# Patient Record
Sex: FEMALE | Race: WHITE | Employment: FULL TIME | URBAN - METROPOLITAN AREA
[De-identification: names, ages, dates, MRNs, and addresses within clinical notes are randomized per-mention and may not be internally consistent; named-entity substitution may affect disease eponyms.]

---

## 2018-04-04 ENCOUNTER — HOSPITAL ENCOUNTER (OUTPATIENT)
Dept: MRI IMAGING | Age: 58
Discharge: HOME | End: 2018-04-04
Attending: FAMILY MEDICINE
Payer: COMMERCIAL

## 2018-04-04 DIAGNOSIS — H53.452 DECREASED PERIPHERAL VISION OF LEFT EYE: ICD-10-CM

## 2018-04-04 DIAGNOSIS — G44.52 NEW DAILY PERSISTENT HEADACHE: ICD-10-CM

## 2018-04-04 LAB — CREAT BLD-MCNC: 0.7 MG/DL (ref 0.6–1.3)

## 2018-04-04 PROCEDURE — 70553 MRI BRAIN STEM W/O & W/DYE: CPT

## 2018-04-04 PROCEDURE — 74011250636 HC RX REV CODE- 250/636: Performed by: FAMILY MEDICINE

## 2018-04-04 PROCEDURE — A9576 INJ PROHANCE MULTIPACK: HCPCS | Performed by: FAMILY MEDICINE

## 2018-04-04 PROCEDURE — 82565 ASSAY OF CREATININE: CPT

## 2018-04-04 RX ADMIN — GADOTERIDOL 15 ML: 279.3 INJECTION, SOLUTION INTRAVENOUS at 10:34

## 2018-04-06 ENCOUNTER — OFFICE VISIT (OUTPATIENT)
Dept: NEUROLOGY | Age: 58
End: 2018-04-06

## 2018-04-06 VITALS
DIASTOLIC BLOOD PRESSURE: 84 MMHG | BODY MASS INDEX: 24.29 KG/M2 | HEART RATE: 76 BPM | HEIGHT: 69 IN | OXYGEN SATURATION: 97 % | WEIGHT: 164 LBS | SYSTOLIC BLOOD PRESSURE: 124 MMHG | RESPIRATION RATE: 17 BRPM | TEMPERATURE: 98.1 F

## 2018-04-06 DIAGNOSIS — H53.9 VISUAL DISTURBANCE: Primary | ICD-10-CM

## 2018-04-06 RX ORDER — METFORMIN HYDROCHLORIDE 1000 MG/1
1000 TABLET ORAL DAILY
COMMUNITY

## 2018-04-06 RX ORDER — LOSARTAN POTASSIUM AND HYDROCHLOROTHIAZIDE 25; 100 MG/1; MG/1
TABLET ORAL
COMMUNITY
Start: 2018-01-31

## 2018-04-06 RX ORDER — BISMUTH SUBSALICYLATE 262 MG
1 TABLET,CHEWABLE ORAL DAILY
COMMUNITY

## 2018-04-06 NOTE — MR AVS SNAPSHOT
[address] [facility] [address] [facility] [id number] [phone] Patient: Amy Litten MRN: [id number] Visit Information Date & Time Provider Department Dept. Phone Encounter #  
 4/6/2018  9:00 AM MD Raiza Farrar Neurology [facility] [phone] [id number] Follow-up Instructions Return for After Tests. Upcoming Health Maintenance Date Due Hepatitis C Screening 1960 DTaP/Tdap/Td series (1 - Tdap) 4/18/1981 PAP AKA CERVICAL CYTOLOGY 4/18/1981 BREAST CANCER SCRN MAMMOGRAM 4/18/2010 FOBT Q 1 YEAR AGE 50-75 4/18/2010 Influenza Age 5 to Adult 8/1/2017 Allergies as of 4/6/2018  Review Complete On: 4/6/2018 By: Carlos Harris LPN Severity Noted Reaction Type Reactions Other Medication  04/06/2018    Unknown (comments) Peridium, symethicon Pcn [Penicillins]  04/06/2018    Unknown (comments) Tape [Adhesive]  04/06/2018    Unknown (comments) Current Immunizations  Never Reviewed No immunizations on file. Not reviewed this visit You Were Diagnosed With   
  
 Codes Comments Visual disturbance    -  Primary ICD-10-CM: H53.9 ICD-9-CM: 368.9 Vitals BP Pulse Temp Resp Height(growth percentile) Weight(growth percentile) 124/84 76 98.1 °F (36.7 °C) 17 5' 8.5\" (1.74 m) 164 lb (74.4 kg) SpO2 BMI OB Status Smoking Status 97% 24.57 kg/m2 Postmenopausal Never Smoker Vitals History BMI and BSA Data Body Mass Index Body Surface Area 24.57 kg/m 2 1.9 m 2 Your Updated Medication List  
  
   
This list is accurate as of 4/6/18  9:53 AM.  Always use your most recent med list. ADVIL PO Take  by mouth. CALCIUM 500 PO Take  by mouth. IRON PO Take  by mouth.  
  
 losartan-hydroCHLOROthiazide 100-25 mg per tablet Commonly known as:  HYZAAR  
  
 metFORMIN 1,000 mg tablet Commonly known as:  GLUCOPHAGE Take 1,000 mg by mouth daily. multivitamin tablet Commonly known as:  ONE A DAY Take 1 Tab by mouth daily. OMEGA 3 PO Take 1,000 mg by mouth daily. TURMERIC ROOT EXTRACT PO Take  by mouth. We Performed the Following REFERRAL TO NEUROLOGY [BQR86 Custom] Comments: VEP VEP VEP VEP Follow-up Instructions Return for After Tests. To-Do List   
 04/06/2018 Imaging:  DUPLEX CAROTID BILATERAL AMB NEURO   
  
 04/06/2018 Neurology:  EMG LIMITED Referral Information Referral ID Referred By Referred To  
  
 [id number] Terry RIVERA MD [address] [facility], [address] [place] Phone: [phone] Fax: [phone] Visits Status Start Date End Date 1 New Request 4/6/18 4/6/19 If your referral has a status of pending review or denied, additional information will be sent to support the outcome of this decision. Patient Instructions Information Regarding Testing If you have physican order for a test or a medication denied by your insurance company, this does not mean the test or medication is not appropriate for you as that is a medical decision, not a decision to be made by an insurance company representative or by an [facility] physician who has not interviewed and examined you. This is a decision to be made between you and your physician. The denial of services is a contractual matter between you and your insurance company, not an issue between your physician and the insurance company. If your test or medication is denied, you can take the following steps to help resolve the issue: 1. File a complaint with the [facility] regarding your insurance company's denial of services ordered for you.   You can do this either by calling them directly or by completing an on-line complaint form on the [employer]. This can be found at www.[place].gov 2. Also file a formal complaint with your insurance company and ask to have the name of the person denying the service so that you may explore a legal option should you be harmed by this denial of service. Again, the fact the insurance company will not pay for the service does not mean it is not medically necessary and I would encourage you to follow through with the plan that was made with your physician 3. File a written complaint with your employer so your employer and benefit manager is aware of the poor coverage they are providing their employees. If you have medicare/medicaid, complain to your representative in the House and to your Phoebe Maria. PRESCRIPTION REFILL POLICY [facility] Statement to Patients April 1, 2014 In an effort to ensure the large volume of patient prescription refills is processed in the most efficient and expeditious manner, we are asking our patients to assist us by calling your Pharmacy for all prescription refills, this will include also your  Mail Order Pharmacy. The pharmacy will contact our office electronically to continue the refill process. Please do not wait until the last minute to call your pharmacy. We need at least 48 hours (2days) to fill prescriptions. We also encourage you to call your pharmacy before going to  your prescription to make sure it is ready. With regard to controlled substance prescription refill requests (narcotic refills) that need to be picked up at our office, we ask your cooperation by providing us with at least 72 hours (3days) notice that you will need a refill. We will not refill narcotic prescription refill requests after 4:00pm on any weekday, Monday through Thursday, or after 2:00pm on Fridays, or on the weekends. We encourage everyone to explore another way of getting your prescription refill request processed using Knomo, our patient web portal through our electronic medical record system. Knomo is an efficient and effective way to communicate your medication request directly to the office and  downloadable as an lili on your smart phone . Knomo also features a review functionality that allows you to view your medication list as well as leave messages for your physician. Are you ready to get connected? If so please review the attatched instructions or speak to any of our staff to get you set up right away! Thank you so much for your cooperation. Should you have any questions please contact our Practice Administrator. The Physicians and Staff,  [facility] If we have ordered testing for you, we do not call patients with results and we do not give test results over the phone. We schedule follow up appointments so that your results can be discussed in person and any questions you have regarding them may be addressed. If something of concern is revealed on your test, we will call you for a sooner follow up appointment. Additionally, results may be found by using the My Chart feature and one of our patient service representatives at the  can give you instructions on how to access this feature of our electronic medical record system. [address] What is a living will? A living will is a legal form you use to write down the kind of care you want at the end of your life. It is used by the health professionals who will treat you if you aren't able to decide for yourself. If you put your wishes in writing, your loved ones and others will know what kind of care you want. They won't need to guess. This can ease your mind and be helpful to others. A living will is not the same as an estate or property will.  An estate will explains what you want to happen with your money and property after you die. Is a living will a legal document? A living will is a legal document. Each state has its own laws about living le. If you move to another state, make sure that your living will is legal in the state where you now live. Or you might use a universal form that has been approved by many states. This kind of form can sometimes be completed and stored online. Your electronic copy will then be available wherever you have a connection to the Internet. In most cases, doctors will respect your wishes even if you have a form from a different state. · You don't need an  to complete a living will. But legal advice can be helpful if your state's laws are unclear, your health history is complicated, or your family can't agree on what should be in your living will. · You can change your living will at any time. Some people find that their wishes about end-of-life care change as their health changes. · In addition to making a living will, think about completing a medical power of  form. This form lets you name the person you want to make end-of-life treatment decisions for you (your \"health care agent\") if you're not able to. Many hospitals and nursing homes will give you the forms you need to complete a living will and a medical power of . · Your living will is used only if you can't make or communicate decisions for yourself anymore. If you become able to make decisions again, you can accept or refuse any treatment, no matter what you wrote in your living will. · Your state may offer an online registry. This is a place where you can store your living will online so the doctors and nurses who need to treat you can find it right away. What should you think about when creating a living will?  
Talk about your end-of-life wishes with your family members and your doctor. Let them know what you want. That way the people making decisions for you won't be surprised by your choices. Think about these questions as you make your living will: · Do you know enough about life support methods that might be used? If not, talk to your doctor so you know what might be done if you can't breathe on your own, your heart stops, or you're unable to swallow. · What things would you still want to be able to do after you receive life-support methods? Would you want to be able to walk? To speak? To eat on your own? To live without the help of machines? · If you have a choice, where do you want to be cared for? In your home? At a hospital or nursing home? · Do you want certain Oriental orthodox practices performed if you become very ill? · If you have a choice at the end of your life, where would you prefer to die? At home? In a hospital or nursing home? Somewhere else? · Would you prefer to be buried or cremated? · Do you want your organs to be donated after you die? What should you do with your living will? · Make sure that your family members and your health care agent have copies of your living will. · Give your doctor a copy of your living will to keep in your medical record. If you have more than one doctor, make sure that each one has a copy. · You may want to put a copy of your living will where it can be easily found. Where can you learn more? Go to http://brian-braxton.info/. Enter R037 in the search box to learn more about \"Learning About Living Prem. \" Current as of: September 24, 2016 Content Version: 11.4 © 0088-3479 [employer]. Care instructions adapted under license by [employer] (which disclaims liability or warranty for this information).  If you have questions about a medical condition or this instruction, always ask your healthcare professional. [address]  any warranty or liability for your use of this information. Introducing [facility]! [employer] introduces [employer] patient portal. Now you can access parts of your medical record, email your doctor's office, and request medication refills online. 1. In your internet browser, go to [email or web address] 2. Click on the First Time User? Click Here link in the Sign In box. You will see the New Member Sign Up page. 3. Enter your [employer] Access Code exactly as it appears below. You will not need to use this code after youve completed the sign-up process. If you do not sign up before the expiration date, you must request a new code. · [employer] Access Code: [email or web address] Expires: 7/2/2018  4:15 PM 
 
4. Enter the last four digits of your Social Security Number (xxxx) and Date of Birth (mm/dd/yyyy) as indicated and click Submit. You will be taken to the next sign-up page. 5. Create a [employer] ID. This will be your [employer] login ID and cannot be changed, so think of one that is secure and easy to remember. 6. Create a [employer] password. You can change your password at any time. 7. Enter your Password Reset Question and Answer. This can be used at a later time if you forget your password. 8. Enter your e-mail address. You will receive e-mail notification when new information is available in [address] 9. Click Sign Up. You can now view and download portions of your medical record. 10. Click the Download Summary menu link to download a portable copy of your medical information. If you have questions, please visit the Frequently Asked Questions section of the [employer] website. Remember, [employer] is NOT to be used for urgent needs. For medical emergencies, dial 911. Now available from your iPhone and Android! Please provide this summary of care documentation to your next provider. Your primary care clinician is listed as Melvi Saldivar.  If you have any questions after today's visit, please call [phone].

## 2018-04-06 NOTE — PROGRESS NOTES
New patient presenting with blurred vision in left eye for past 10 days. Has tingling on left side of face with pain behind left eye. Has seen opthalmology @ [facility]. Testing WNL. Had MRI brain. WNL.

## 2018-04-06 NOTE — PATIENT INSTRUCTIONS
Information Regarding Testing     If you have physican order for a test or a medication denied by your insurance company, this does not mean the test or medication is not appropriate for you as that is a medical decision, not a decision to be made by an insurance company representative or by an [facility] physician who has not interviewed and examined you. This is a decision to be made between you and your physician. The denial of services is a contractual matter between you and your insurance company, not an issue between your physician and the insurance company. If your test or medication is denied, you can take the following steps to help resolve the issue:    1. File a complaint with the [facility] regarding your insurance company's denial of services ordered for you. You can do this either by calling them directly or by completing an on-line complaint form on the [employer]. This can be found at www.120 Sports    2. Also file a formal complaint with your insurance company and ask to have the name of the person denying the service so that you may explore a legal option should you be harmed by this denial of service. Again, the fact the insurance company will not pay for the service does not mean it is not medically necessary and I would encourage you to follow through with the plan that was made with your physician    3. File a written complaint with your employer so your employer and benefit manager is aware of the poor coverage they are providing their employees. If you have medicare/medicaid, complain to your representative in the House and to your Phoebe Maria.     10 [facility]   Statement to Patients  April 1, 2014      In an effort to ensure the large volume of patient prescription refills is processed in the most efficient and expeditious manner, we are asking our patients to assist us by calling your Pharmacy for all prescription refills, this will include also your  Mail Order Pharmacy. The pharmacy will contact our office electronically to continue the refill process. Please do not wait until the last minute to call your pharmacy. We need at least 48 hours (2days) to fill prescriptions. We also encourage you to call your pharmacy before going to  your prescription to make sure it is ready. With regard to controlled substance prescription refill requests (narcotic refills) that need to be picked up at our office, we ask your cooperation by providing us with at least 72 hours (3days) notice that you will need a refill. We will not refill narcotic prescription refill requests after 4:00pm on any weekday, Monday through Thursday, or after 2:00pm on Fridays, or on the weekends. We encourage everyone to explore another way of getting your prescription refill request processed using Passado, our patient web portal through our electronic medical record system. Passado is an efficient and effective way to communicate your medication request directly to the office and  downloadable as an lili on your smart phone . Passado also features a review functionality that allows you to view your medication list as well as leave messages for your physician. Are you ready to get connected? If so please review the attatched instructions or speak to any of our staff to get you set up right away! Thank you so much for your cooperation. Should you have any questions please contact our Practice Administrator. The Physicians and Staff,  [facility]     If we have ordered testing for you, we do not call patients with results and we do not give test results over the phone. We schedule follow up appointments so that your results can be discussed in person and any questions you have regarding them may be addressed.   If something of concern is revealed on your test, we will call you for a sooner follow up appointment. Additionally, results may be found by using the My Chart feature and one of our patient service representatives at the  can give you instructions on how to access this feature of our electronic medical record system. Learning About Living Prem  What is a living will? A living will is a legal form you use to write down the kind of care you want at the end of your life. It is used by the health professionals who will treat you if you aren't able to decide for yourself. If you put your wishes in writing, your loved ones and others will know what kind of care you want. They won't need to guess. This can ease your mind and be helpful to others. A living will is not the same as an estate or property will. An estate will explains what you want to happen with your money and property after you die. Is a living will a legal document? A living will is a legal document. Each state has its own laws about living le. If you move to another state, make sure that your living will is legal in the state where you now live. Or you might use a universal form that has been approved by many states. This kind of form can sometimes be completed and stored online. Your electronic copy will then be available wherever you have a connection to the Internet. In most cases, doctors will respect your wishes even if you have a form from a different state. · You don't need an  to complete a living will. But legal advice can be helpful if your state's laws are unclear, your health history is complicated, or your family can't agree on what should be in your living will. · You can change your living will at any time. Some people find that their wishes about end-of-life care change as their health changes. · In addition to making a living will, think about completing a medical power of  form.  This form lets you name the person you want to make end-of-life treatment decisions for you (your \"health care agent\") if you're not able to. Many hospitals and nursing homes will give you the forms you need to complete a living will and a medical power of . · Your living will is used only if you can't make or communicate decisions for yourself anymore. If you become able to make decisions again, you can accept or refuse any treatment, no matter what you wrote in your living will. · Your state may offer an online registry. This is a place where you can store your living will online so the doctors and nurses who need to treat you can find it right away. What should you think about when creating a living will? Talk about your end-of-life wishes with your family members and your doctor. Let them know what you want. That way the people making decisions for you won't be surprised by your choices. Think about these questions as you make your living will:  · Do you know enough about life support methods that might be used? If not, talk to your doctor so you know what might be done if you can't breathe on your own, your heart stops, or you're unable to swallow. · What things would you still want to be able to do after you receive life-support methods? Would you want to be able to walk? To speak? To eat on your own? To live without the help of machines? · If you have a choice, where do you want to be cared for? In your home? At a hospital or nursing home? · Do you want certain Mosque practices performed if you become very ill? · If you have a choice at the end of your life, where would you prefer to die? At home? In a hospital or nursing home? Somewhere else? · Would you prefer to be buried or cremated? · Do you want your organs to be donated after you die? What should you do with your living will? · Make sure that your family members and your health care agent have copies of your living will. · Give your doctor a copy of your living will to keep in your medical record.  If you have more than one doctor, make sure that each one has a copy. · You may want to put a copy of your living will where it can be easily found. Where can you learn more? Go to http://brian-braxton.info/. Enter F652 in the search box to learn more about \"Learning About Living Perroy. \"  Current as of: September 24, 2016  Content Version: 11.4  © 4124-9812 [employer]. Care instructions adapted under license by [employer] (which disclaims liability or warranty for this information). If you have questions about a medical condition or this instruction, always ask your healthcare professional. [address] any warranty or liability for your use of this information.

## 2018-04-06 NOTE — PROGRESS NOTES
EMG/ NCS Report  [facility], [address]  Ph: [phone]/ [phone]  FAX: [phone]/ [phone]  Test Date:  2018    Patient: Marizol Cordova : 1960 Physician: Zakiya Kamara   Sex: Female Height: ' \" Ref Phys: Rory Tapia MD   ID#: [id number] Weight:  lbs. Technician: Ronald Sanchez     Patient History / Exam:  CC:VISUAL DISTURBANCE,R/O MG          EMG & NCV Findings:    Impression:        ___________________________  CASPER RIVERA MD      VEP     Trace N75 (ms) P100 (ms) N145 (ms) P50-Y331 (µV)   Norm  <117     *L :  74.2 103.9 137.9 5.96   *R :  69.9 118.8 149.6 4.05   L-R Norm  <7     L-R 4.3 14.9 11.7 1.91   *A 92 millisecond delay was used for the monitor.                    Waveforms:

## 2018-04-08 NOTE — PROCEDURES
Carotid Doppler:     Date:  04/06/2018     Requesting Physician:  Nedra Ramos MD     Indication:  Visual disturbance     B-mode imaging reveals mild plaque at the bifurcations bilaterally. Doppler spectral analysis reveals no elevated velocities. Vertebral artery flow antegrade bilaterally. Interpretation:    1. Plaque as noted. 2. No significant stenosis.

## 2018-04-08 NOTE — PROCEDURES
[facility] [place]   Visual Evoked Potential Report    Date of Service: 4/6/2018  Referring: Dr. Anthony Keller  Indication: Visual Disturbance    Bilateral full field pattern reversal visual evoked potential is performed. Waveforms are appropriate for interpretation. Absolute latency of the P100 is prolonged on the right.     Interpretation  Prolongation of P100 right indicative of a lesion of the optic nerve anterior to the chiasm right    Matthew Martin MD

## 2018-04-12 ENCOUNTER — OFFICE VISIT (OUTPATIENT)
Dept: NEUROLOGY | Age: 58
End: 2018-04-12

## 2018-04-12 DIAGNOSIS — R20.2 PARESTHESIA: ICD-10-CM

## 2018-04-12 DIAGNOSIS — H53.2 DIPLOPIA: Primary | ICD-10-CM

## 2018-04-12 NOTE — PROCEDURES
EMG/ NCS Report   [facility]  [facility] [address] [place] [address] [address], [address]   Ph: [phone]   FAX: [phone]/ [phone]  Test Date:  2018    Patient: Breann Rosas : 1960 Physician: Socoby Corrigan MD   Sex: Female Height: ' \" Ref Phys: Nava Tubbs MD   ID#: [id number] Weight:  lbs. Technician: Shaun Urias     Patient History / Exam:  Patient is coming for intermittent blurred vision and double vision associated with numbness of the L face. Patient is coming for myasthenia gravis evaluation      EMG & NCV Findings:  Evaluation of the right median motor nerve showed normal distal onset latency (2.6 ms), normal amplitude (7.0 mV), and normal conduction velocity (Elbow-Wrist, 50 m/s). The right ulnar motor nerve showed normal distal onset latency (3.0 ms), normal amplitude (7.1 mV), normal conduction velocity (B Elbow-Wrist, 51 m/s), and normal conduction velocity (A Elbow-B Elbow, 63 m/s). The right median sensory, the right radial sensory, and the right ulnar sensory nerves showed normal distal peak latency (R2.8, R2.0, R3.0 ms) and normal amplitude (R46.7, R53.9, R40.9 µV). All F Wave latencies were within normal limits. Impression:  NORMAL    Nerve conduction study of the right upper extremity is normal with no evidence of a peripheral neuropathy. 3 Hz repetitive nerve stimulation and exercise testing of the right median nerve recording APB muscle and right spinal accessory nerve recording trapezius muscle are normal with no definite evidence to suggest a generalized post-synaptic disorder of the  neuromuscular junction.         Scooby Corrigan MD  Diplomate, American Board of Psychiatry and Neurology  Diplomate, Neuromuscular Medicine  Diplomate, American Board of Electrodiagnostic Medicine  Director, [address] [place] [address] Accredited Laboratory with Exemplary Status        Nerve Conduction Studies  Anti Sensory Summary Table Stim Site NR Peak (ms) Norm Peak (ms) P-T Amp (µV) Norm P-T Amp Site1 Site2 Dist (cm)   Right Median Anti Sensory (2nd Digit)  32.5°C   Wrist    2.8 <4 46.7 >13 Wrist 2nd Digit 14.0   Right Radial Anti Sensory (Base 1st Digit)  33.5°C   Wrist    2.0 <2.8 53.9 >11 Wrist Base 1st Digit 10.0   Right Ulnar Anti Sensory (5th Digit)  33.9°C   Wrist    3.0 <4.0 40.9 >9 Wrist 5th Digit 14.0     Motor Summary Table     Stim Site NR Onset (ms) Norm Onset (ms) O-P Amp (mV) Norm O-P Amp Amp (Prev) (%) Site1 Site2 Dist (cm) Daniel (m/s) Norm Daniel (m/s)   Right Median Motor (Abd Poll Brev)  33.5°C   Wrist    2.6 <4.5 7.0 >4.1 100.0 Wrist Abd Poll Brev 8.0     Elbow    7.0  7.0  100.0 Elbow Wrist 22.0 50 >49   Right Ulnar Motor (Abd Dig Minimi)  22.3°C   Wrist    3.0 <3.1 7.1 >7.0 100.0 Wrist Abd Dig Minimi 8.0  >50   B Elbow    7.0  6.6  93.0 B Elbow Wrist 20.5 51 >50   A Elbow    8.6  6.1  92.4 A Elbow B Elbow 10.0 63 >50     F Wave Studies     NR F-Lat (ms) Lat Norm (ms) L-R F-Lat (ms) L-R Lat Norm   Right Ulnar (Mrkrs) (Abd Dig Min)      27.78 <32  <2.5     RNS     Trial # Label Amp 1 (mV)  O-P Amp 5 (mV)  O-P Amp % Dif Area 1 (mV·ms) Area 5 (mV·ms) Area % Dif Rep Rate Train Length Pause Time (min:sec) Comments   Right Abd Poll Brev   Tr 8 Baseline 5.53 5.43 -1.8 19.70 18.98 -3.7 3.00 10 00:30    Tr 9 Post Excersise 5.97 6.03 1.1 20.40 19.92 -2.4 3.00 10 01:00    Tr 10 1 min Post 6.01 5.67 -5.6 21.96 20.12 -8.4 3.00 10 01:00    Tr 11 2 min Post 6.04 5.78 -4.3 22.16 20.21 -8.8 3.00 10 01:00    Tr 12 3 min Post 6.23 5.99 -3.9 22.16 20.75 -6.4 3.00 10 00:00    Right Trapezius   Tr 2 Baseline 14.47 14.55 0.6 203.29 194.59 -4.3 3.00 10 00:30    Tr 3 Post Excersise 14.37 14.25 -0.8 193.53 185.67 -4.1 3.00 10 01:00    Tr 4 1 min Post 14.50 14.38 -0.8 0.00 0.00 0.0 3.00 10 01:00    Tr 5 2 min Post 14.42 13.89 -3.7 193.07 182.56 -5.4 3.00 10 01:00    Tr 6 3 min Post 14.37 14.36 -0.1 196.82 0.00 -100.0 3.00 10 00:00

## 2018-04-27 ENCOUNTER — OFFICE VISIT (OUTPATIENT)
Dept: NEUROLOGY | Age: 58
End: 2018-04-27

## 2018-04-27 VITALS — BODY MASS INDEX: 25.03 KG/M2 | WEIGHT: 169 LBS | HEIGHT: 69 IN

## 2018-04-27 DIAGNOSIS — H53.9 VISUAL DISTURBANCE: Primary | ICD-10-CM

## 2018-04-27 NOTE — PATIENT INSTRUCTIONS
[facility]   Statement to Patients  April 1, 2014      In an effort to ensure the large volume of patient prescription refills is processed in the most efficient and expeditious manner, we are asking our patients to assist us by calling your Pharmacy for all prescription refills, this will include also your  Mail Order Pharmacy. The pharmacy will contact our office electronically to continue the refill process. Please do not wait until the last minute to call your pharmacy. We need at least 48 hours (2days) to fill prescriptions. We also encourage you to call your pharmacy before going to  your prescription to make sure it is ready. With regard to controlled substance prescription refill requests (narcotic refills) that need to be picked up at our office, we ask your cooperation by providing us with at least 72 hours (3days) notice that you will need a refill. We will not refill narcotic prescription refill requests after 4:00pm on any weekday, Monday through Thursday, or after 2:00pm on Fridays, or on the weekends. We encourage everyone to explore another way of getting your prescription refill request processed using [employer], our patient web portal through our electronic medical record system. [employer] is an efficient and effective way to communicate your medication request directly to the office and  downloadable as an lili on your smart phone . [employer] also features a review functionality that allows you to view your medication list as well as leave messages for your physician. Are you ready to get connected? If so please review the attatched instructions or speak to any of our staff to get you set up right away! Thank you so much for your cooperation. Should you have any questions please contact our Practice Administrator.     The Physicians and Staff,  [facility]

## 2018-04-27 NOTE — PROGRESS NOTES
Heather Bond is a 62 y.o. female who presents with the following  Chief Complaint   Patient presents with    Results       HPI Patient comes in for a follow up for EMG, VEP, and carotid doppler results. She states her vision is still compromised on the left side and it feels like she has vaseline over her eye when she is looking. She works at [employer] and has had multiple eye tests with no results. Also has had an MRI which was normal.     Allergies   Allergen Reactions    Other Medication Unknown (comments)     Peridium, symethicon    Pcn [Penicillins] Unknown (comments)    Tape [Adhesive] Unknown (comments)       Current Outpatient Prescriptions   Medication Sig    metFORMIN (GLUCOPHAGE) 1,000 mg tablet Take 1,000 mg by mouth daily.  losartan-hydroCHLOROthiazide (HYZAAR) 100-25 mg per tablet     IBUPROFEN (ADVIL PO) Take  by mouth.  FLAXSEED OIL (OMEGA 3 PO) Take 1,000 mg by mouth daily.  TURMERIC ROOT EXTRACT PO Take  by mouth.  FERROUS SULFATE (IRON PO) Take  by mouth.  CALCIUM CARBONATE (CALCIUM 500 PO) Take  by mouth.  multivitamin (ONE A DAY) tablet Take 1 Tab by mouth daily. No current facility-administered medications for this visit.         History   Smoking Status    Never Smoker   Smokeless Tobacco    Never Used       Past Medical History:   Diagnosis Date    Anxiety     Asthma     Benign tumor of kidney     Celiac disease     Diabetes ([address]     Episcleritis of left eye 04/25/2018    Fatigue     Headache     Herniated thoracic disc without myelopathy     Hypertension     Joint pain     Memory loss     Muscle pain     Muscle weakness     Snoring     Vertigo     Visual disturbance        Past Surgical History:   Procedure Laterality Date    HX GYN      HX ORTHOPAEDIC         Family History   Problem Relation Age of Onset    Headache Mother     Heart Disease Mother     Headache Sister        Social History     Social History    Marital status:  Spouse name: N/A    Number of children: N/A    Years of education: N/A     Social History Main Topics    Smoking status: Never Smoker    Smokeless tobacco: Never Used    Alcohol use No    Drug use: None    Sexual activity: Not Asked     Other Topics Concern    None     Social History Narrative       Review of Systems   Eyes: Positive for blurred vision. Remainder of comprehensive review is negative. Physical Exam :    Visit Vitals    Ht 5' 8.5\" (1.74 m)    Wt 76.7 kg (169 lb)    BMI 25.32 kg/m2             Results for orders placed or performed during the hospital encounter of 04/04/18   POC CREATININE   Result Value Ref Range    Creatinine (POC) 0.7 0.6 - 1.3 mg/dL    GFRAA, POC >60 >60 ml/min/1.73m2    GFRNA, POC >60 >60 ml/min/1.73m2       No orders of the defined types were placed in this encounter. No diagnosis found. Follow-up Disposition: Not on File    EMG, carotid doppler and VEP are all normal. We discussed these in full. She has seen an eye specialist as she works there. We discussed neuro eye vs. Rheumatology would be other referrals and she will talk to PCP. Print out results for her.  No other neurological testing warranted at this time           This note will not be viewable in [employer]

## 2018-04-27 NOTE — MR AVS SNAPSHOT
[address] [place] [address] [address] [facility] [address] [phone] Patient: Hawa Artis MRN: [id number] QMO:[id number][birth year]98/[birth year]46 Visit Information Date & Time Provider Department Dept. Phone Encounter #  
 4/27/2018  1:00 PM Deane Duane, NP [facility] [phone] [id number] Upcoming Health Maintenance Date Due Hepatitis C Screening 1960 DTaP/Tdap/Td series (1 - Tdap) 4/18/1981 PAP AKA CERVICAL CYTOLOGY 4/18/1981 BREAST CANCER SCRN MAMMOGRAM 4/18/2010 FOBT Q 1 YEAR AGE 50-75 4/18/2010 Influenza Age 5 to Adult 8/1/2018 Allergies as of 4/27/2018  Review Complete On: 4/27/2018 By: Sloane Eckert Severity Noted Reaction Type Reactions Other Medication  04/06/2018    Unknown (comments) Peridium, symethicon Pcn [Penicillins]  04/06/2018    Unknown (comments) Tape [Adhesive]  04/06/2018    Unknown (comments) Current Immunizations  Never Reviewed No immunizations on file. Not reviewed this visit Vitals Height(growth percentile) Weight(growth percentile) BMI OB Status Smoking Status 5' 8.5\" (1.74 m) 169 lb (76.7 kg) 25.32 kg/m2 Postmenopausal Never Smoker Vitals History BMI and BSA Data Body Mass Index Body Surface Area  
 25.32 kg/m 2 1.93 m 2 Your Updated Medication List  
  
   
This list is accurate as of 4/27/18  1:33 PM.  Always use your most recent med list. ADVIL PO Take  by mouth. CALCIUM 500 PO Take  by mouth. IRON PO Take  by mouth.  
  
 losartan-hydroCHLOROthiazide 100-25 mg per tablet Commonly known as:  HYZAAR  
  
 metFORMIN 1,000 mg tablet Commonly known as:  GLUCOPHAGE Take 1,000 mg by mouth daily. multivitamin tablet Commonly known as:  ONE A DAY Take 1 Tab by mouth daily. OMEGA 3 PO Take 1,000 mg by mouth daily.   
  
 TURMERIC ROOT EXTRACT PO  
 Take  by mouth. Patient Instructions PRESCRIPTION REFILL POLICY [facility] Statement to Patients April 1, 2014 In an effort to ensure the large volume of patient prescription refills is processed in the most efficient and expeditious manner, we are asking our patients to assist us by calling your Pharmacy for all prescription refills, this will include also your  Mail Order Pharmacy. The pharmacy will contact our office electronically to continue the refill process. Please do not wait until the last minute to call your pharmacy. We need at least 48 hours (2days) to fill prescriptions. We also encourage you to call your pharmacy before going to  your prescription to make sure it is ready. With regard to controlled substance prescription refill requests (narcotic refills) that need to be picked up at our office, we ask your cooperation by providing us with at least 72 hours (3days) notice that you will need a refill. We will not refill narcotic prescription refill requests after 4:00pm on any weekday, Monday through Thursday, or after 2:00pm on Fridays, or on the weekends. We encourage everyone to explore another way of getting your prescription refill request processed using Z[employer], our patient web portal through our electronic medical record system. Zhihu is an efficient and effective way to communicate your medication request directly to the office and  downloadable as an lili on your smart phone . Zhihu also features a review functionality that allows you to view your medication list as well as leave messages for your physician. Are you ready to get connected? If so please review the attatched instructions or speak to any of our staff to get you set up right away! Thank you so much for your cooperation. Should you have any questions please contact our Practice Administrator. The Physicians and Staff,  [facility] Introducing [facility]! Rosangela Raine introduces [employer] patient portal. Now you can access parts of your medical record, email your doctor's office, and request medication refills online. 1. In your internet browser, go to [email or web address] 2. Click on the First Time User? Click Here link in the Sign In box. You will see the New Member Sign Up page. 3. Enter your Bablic Access Code exactly as it appears below. You will not need to use this code after youve completed the sign-up process. If you do not sign up before the expiration date, you must request a new code. · Bablic Access Code: [email or web address] Expires: 7/2/2018  4:15 PM 
 
4. Enter the last four digits of your Social Security Number (xxxx) and Date of Birth (mm/dd/yyyy) as indicated and click Submit. You will be taken to the next sign-up page. 5. Create a Bablic ID. This will be your Bablic login ID and cannot be changed, so think of one that is secure and easy to remember. 6. Create a Bablic password. You can change your password at any time. 7. Enter your Password Reset Question and Answer. This can be used at a later time if you forget your password. 8. Enter your e-mail address. You will receive e-mail notification when new information is available in [address] 9. Click Sign Up. You can now view and download portions of your medical record. 10. Click the Download Summary menu link to download a portable copy of your medical information. If you have questions, please visit the Frequently Asked Questions section of the Bablic website. Remember, Bablic is NOT to be used for urgent needs. For medical emergencies, dial 911. Now available from your iPhone and Android! Please provide this summary of care documentation to your next provider. Your primary care clinician is listed as Melvi Saldivar. If you have any questions after today's visit, please call [phone].

## 2019-12-19 NOTE — PROGRESS NOTES
[address] [facility] [address]   [facility] [address] [place], [facility]   [phone] [facility]   [phone] Fax             Referring: Yves Duran MD      Chief Complaint   Patient presents with    Numbness     new patient       75-year-old right-handed woman who presents today for evaluation of what she calls double vision blurred vision and reduced vision in the left eye. She says that 10 days ago she had decreased visual acuity in the left eye. She notes that she also has diplopia. She also has aching in the left eye. She says that she is seeing superimposed images. She awakened with this. There were no changes in her lifestyle. No changes in medication. No injury. No inciting factor. She closes her left eye and has clear vision. If she opens the left eye she has blurred vision. She notes 2 months ago she started to have some dislocation of the right jaw and she wonders if that has anything to do with it. No other strange symptoms. No other changes on the left side of the hemibody. No weakness. No numbness or tingling. She has not had any drooping of the eyelid. No difficulty with breathing. No difficulty swallowing. She had evaluation of her thoroughly at [employer] where she is an employee. She says she had \"all the tests\" and they were negative. She had a visual examination. She had photographs. She had dilated exam.  She had evaluation by the retinal specialist.  She was told her vision in the left eye was 20/60. She had MRI of the brain performed which I have personally reviewed and this demonstrated some nonspecific white matter changes. He saw a physician who thought that it was perhaps a sinus issue was given some prednisone and that did not make much difference. In the past she has had episodes of vertigo that was thought to be possible Ménière's disease may be benign positional vertigo.   She also had an episode of [facility] to allow [facility] to have a f/u c-scope 3 months from his most recent   I talked to him tonight and told him this  He was grateful the ca did not get into the submucosa     Bell's palsy in the past.  No other focal neurologic deficits that have persisted and then spontaneously resolved. She has recently moved out from the [facility].  Does have diabetes noting that her last hemoglobin A1c was 6.4. She is compliant with her medications. No chest pain. No palpitations. No shortness of breath. No difficulty climbing stairs. No past medical history on file. She has had a history of migraine headaches in the past.  His diabetes. Has hypertension. Has exercise-induced asthma. Has had renal calculi in the past.  Has celiac disease. Has that mentioned above. Has had some low back pain and disc issues. Has had hysterectomy. Has had knee surgery in the past and has lithotripsy in the past for her renal calculi. No past surgical history on file. As noted above       No current outpatient prescriptions on file prior to visit. No current facility-administered medications on file prior to visit. Medications include metformin 1 g twice daily  Losartan/HCTZ  Advil  Omega-3  Tumor  Iron  Calcium  Multivitamin    Allergies   Allergen Reactions    Pcn [Penicillins] Unknown (comments)       Social History   Substance Use Topics    Smoking status: Not on file    Smokeless tobacco: Not on file    Alcohol use Not on file       No family history on file. Her mother and sister of migraine headaches. Mother is heart disease. Review of Systems  Pertinent positives and negatives are as noted above otherwise comprehensive systems review is negative    Examination  Visit Vitals    /84    Pulse 76    Temp 98.1 °F (36.7 °C)    Resp 17    Ht 5' 8.5\" (1.74 m)    Wt 74.4 kg (164 lb)    SpO2 97%    BMI 24.57 kg/m2     Pleasant, well appearing. She has appropriate dress and grooming. No icterus. Oropharynx clear. Supple neck without bruit. Heart regular. No murmur. Her pulses are symmetrical.  no edema.       Neurologically, she is awake, alert, and oriented with normal speech and language. Her cognition is normal.   Intact cranial nerves 2-12. No nystagmus. Visual fields full to confrontation. Disk margins are flat bilaterally. She has normal bulk and tone. She has no abnormal movement. She has no pronation or drift. She generates full strength in the upper and lower extremities to direct confrontational testing. Reflexes are symmetrical in the upper and lower extremities bilaterally. Her toes are down bilaterally. No [place]. Finger nose finger and rapid alternating movements are normal.  Steady gait. No sensory deficit to primary modalities. Impression/Plan  71-year-old lady with onset of left eye decreased visual acuity and complaints of double vision as noted above with normal imaging and normal intrinsic eye examination and differential diagnosis certainly would be for neuromuscular process such as myasthenia. She does note that Dr. Uzma Christianson has sent a myasthenia panel on her visit from yesterday so we will await the results of that. We will go ahead and get an EMG. To be complete we will get a visual evoked potential as well as a carotid Doppler. She will follow-up after her testing. Lorrie Oseguera MD      This note was created using voice recognition software. Despite editing, there may be syntax errors. This note will not be viewable in [address]

## 2020-07-27 ENCOUNTER — HOSPITAL ENCOUNTER (OUTPATIENT)
Dept: MAMMOGRAPHY | Age: 60
Discharge: HOME | End: 2020-07-27
Attending: FAMILY MEDICINE
Payer: COMMERCIAL

## 2020-07-27 ENCOUNTER — HOSPITAL ENCOUNTER (OUTPATIENT)
Dept: MRI IMAGING | Age: 60
Discharge: HOME | End: 2020-07-27
Attending: FAMILY MEDICINE
Payer: COMMERCIAL

## 2020-07-27 VITALS — BODY MASS INDEX: 23.97 KG/M2 | WEIGHT: 160 LBS

## 2020-07-27 DIAGNOSIS — H53.2 DIPLOPIA: ICD-10-CM

## 2020-07-27 DIAGNOSIS — Z12.31 VISIT FOR SCREENING MAMMOGRAM: ICD-10-CM

## 2020-07-27 DIAGNOSIS — G44.52 NEW DAILY PERSISTENT HEADACHE: ICD-10-CM

## 2020-07-27 DIAGNOSIS — R20.2 PARESTHESIA: ICD-10-CM

## 2020-07-27 PROCEDURE — 74011250636 HC RX REV CODE- 250/636: Performed by: FAMILY MEDICINE

## 2020-07-27 PROCEDURE — 70553 MRI BRAIN STEM W/O & W/DYE: CPT

## 2020-07-27 PROCEDURE — A9575 INJ GADOTERATE MEGLUMI 0.1ML: HCPCS | Performed by: FAMILY MEDICINE

## 2020-07-27 PROCEDURE — 77067 SCR MAMMO BI INCL CAD: CPT

## 2020-07-27 RX ORDER — GADOTERATE MEGLUMINE 376.9 MG/ML
15 INJECTION INTRAVENOUS
Status: COMPLETED | OUTPATIENT
Start: 2020-07-27 | End: 2020-07-27

## 2020-07-27 RX ADMIN — GADOTERATE MEGLUMINE 15 ML: 376.9 INJECTION INTRAVENOUS at 10:00

## 2020-10-28 ENCOUNTER — TRANSCRIBE ORDER (OUTPATIENT)
Dept: GENERAL RADIOLOGY | Age: 60
End: 2020-10-28

## 2020-10-28 ENCOUNTER — HOSPITAL ENCOUNTER (OUTPATIENT)
Dept: GENERAL RADIOLOGY | Age: 60
Discharge: HOME | End: 2020-10-28
Attending: FAMILY MEDICINE

## 2020-10-28 DIAGNOSIS — R07.81 RIB PAIN: ICD-10-CM

## 2020-10-28 DIAGNOSIS — R07.81 RIB PAIN: Primary | ICD-10-CM

## 2022-09-27 ENCOUNTER — TRANSCRIBE ORDER (OUTPATIENT)
Dept: SCHEDULING | Age: 62
End: 2022-09-27

## 2022-09-27 DIAGNOSIS — M81.0 OSTEOPOROSIS: Primary | ICD-10-CM

## 2022-09-30 ENCOUNTER — HOSPITAL ENCOUNTER (OUTPATIENT)
Dept: BONE DENSITY | Age: 62
Discharge: HOME | End: 2022-09-30
Attending: ORTHOPAEDIC SURGERY
Payer: COMMERCIAL

## 2022-09-30 DIAGNOSIS — M81.0 OSTEOPOROSIS: ICD-10-CM

## 2022-09-30 PROCEDURE — 77080 DXA BONE DENSITY AXIAL: CPT
